# Patient Record
Sex: FEMALE | Race: WHITE | Employment: PART TIME | URBAN - METROPOLITAN AREA
[De-identification: names, ages, dates, MRNs, and addresses within clinical notes are randomized per-mention and may not be internally consistent; named-entity substitution may affect disease eponyms.]

---

## 2018-02-20 ENCOUNTER — OFFICE VISIT (OUTPATIENT)
Dept: OBGYN CLINIC | Age: 28
End: 2018-02-20

## 2018-02-20 ENCOUNTER — HOSPITAL ENCOUNTER (OUTPATIENT)
Dept: LAB | Age: 28
Discharge: HOME | End: 2018-02-20
Payer: COMMERCIAL

## 2018-02-20 ENCOUNTER — TELEPHONE (OUTPATIENT)
Dept: OBGYN CLINIC | Age: 28
End: 2018-02-20

## 2018-02-20 VITALS
SYSTOLIC BLOOD PRESSURE: 105 MMHG | DIASTOLIC BLOOD PRESSURE: 72 MMHG | BODY MASS INDEX: 37.03 KG/M2 | HEIGHT: 63 IN | WEIGHT: 209 LBS | HEART RATE: 54 BPM

## 2018-02-20 DIAGNOSIS — N94.4 PRIMARY DYSMENORRHEA: Primary | ICD-10-CM

## 2018-02-20 DIAGNOSIS — Z12.4 SCREENING FOR MALIGNANT NEOPLASM OF CERVIX: ICD-10-CM

## 2018-02-20 DIAGNOSIS — Z30.09 ENCOUNTER FOR OTHER GENERAL COUNSELING OR ADVICE ON CONTRACEPTION: ICD-10-CM

## 2018-02-20 DIAGNOSIS — Z01.419 WELL WOMAN EXAM WITH ROUTINE GYNECOLOGICAL EXAM: ICD-10-CM

## 2018-02-20 PROCEDURE — 88175 CYTOPATH C/V AUTO FLUID REDO: CPT | Performed by: OBSTETRICS & GYNECOLOGY

## 2018-02-20 PROCEDURE — 87624 HPV HI-RISK TYP POOLED RSLT: CPT | Performed by: OBSTETRICS & GYNECOLOGY

## 2018-02-20 RX ORDER — NORETHINDRONE ACETATE AND ETHINYL ESTRADIOL AND FERROUS FUMARATE 1MG-20(24)
1 KIT ORAL DAILY
Qty: 1 PACKAGE | Refills: 11 | Status: SHIPPED | OUTPATIENT
Start: 2018-02-20 | End: 2018-03-04

## 2018-02-20 NOTE — LETTER
2/27/2018 4:41 PM 
 
Ms. Delphine Chaidez [address] Ivette Carbone [address] [place] [id number] Dear Delphine Chaidez I have reviewed your results and have found the results listed below to be within normal ranges. Pap Smear: Normal 
 
My recommendations are as follows: Please schedule next Pap in 3 years, February 2021 Please call if you have any questions [phone] . Sincerely, 
 
 
Mary See MD

## 2018-02-20 NOTE — TELEPHONE ENCOUNTER
I have attempted to contact this patient by phone with the following results: left message to return my call on answering machine  Per Dr Kristian Wyatt , additional  information is needed concerning medication request .

## 2018-02-20 NOTE — PROGRESS NOTES
Subjective:   28 y.o. female for Well Woman Check. LMP 2/14 2018  Social History: single partner, contraception - condoms. Pertinent past medical hstory: hypertension, obesity    Patient Active Problem List    Diagnosis Date Noted    Vascular headache 01/04/2016    Post concussion syndrome 01/04/2016     Current Outpatient Prescriptions   Medication Sig Dispense Refill    norethindrone-e.estradiol-iron (MINASTRIN 24 FE) 1 mg-20 mcg(24) /75 mg (4) chew Take 1 Tab by mouth daily. Indications: DYSMENORRHEA 1 Package 11    FEXOFENADINE HCL (ALLEGRA PO) Take  by mouth.  propranolol LA (INDERAL LA) 160 mg capsule Take  by mouth daily.  naproxen (NAPROSYN) 500 mg tablet Take 1 Tab by mouth two (2) times daily (with meals). 60 Tab 1    topiramate (TOPAMAX) 25 mg tablet 3 at bedtime  x7 days then 4 at bedtime 120 Tab 1    tiZANidine (ZANAFLEX) 4 mg tablet 1/2  To 1  q am 11/2 to 2 qhs 90 Tab 1    LECITHIN by Does Not Apply route.  ergocalciferol (VITAMIN D2) 50,000 unit capsule Take 50,000 Units by mouth.  therapeutic multivitamin (THERAGRAN) tablet Take 1 tablet by mouth daily.  omega-3 fatty acids (FISH OIL) cap Take  by mouth.  calcium 500 mg tab Take  by mouth.  buPROPion XL (WELLBUTRIN XL) 300 mg XL tablet Take 300 mg by mouth every morning.        Allergies   Allergen Reactions    Pcn [Penicillins] Rash     Past Medical History:   Diagnosis Date    ADHD (attention deficit hyperactivity disorder) 3rd grade    Anxiety teenager    Hypertension     Vascular headache 1/4/2016     Past Surgical History:   Procedure Laterality Date    HX ANKLE FRACTURE TX  2000    broke rt ankle    HX ORTHOPAEDIC  age 7    broke left arm     Family History   Problem Relation Age of Onset    Heart Disease Mother     Hypertension Father     Breast Cancer Maternal Grandmother     Heart Disease Maternal Grandfather      Social History   Substance Use Topics    Smoking status: Never Smoker  Smokeless tobacco: Never Used    Alcohol use 1.5 oz/week     3 Glasses of wine per week      Comment: patient states none since Dec 2015        ROS:  Feeling well. No dyspnea or chest pain on exertion. No abdominal pain, change in bowel habits, black or bloody stools. No urinary tract symptoms. GYN ROS: normal menses, no abnormal bleeding, pelvic pain or discharge, no breast pain or new or enlarging lumps on self exam. No neurological complaints. Objective:     Visit Vitals    /72    Pulse (!) 54    Ht 5' 3\" (1.6 m)    Wt 209 lb (94.8 kg)    BMI 37.02 kg/m2     The patient appears well, alert, oriented x 3, in no distress. Neck supple. No adenopathy or thyromegaly. Lungs are clear, good air entry, no wheezes, rhonchi or rales. S1 and S2 normal, no murmurs, regular rate and rhythm. Abdomen soft without tenderness, guarding, mass or organomegaly. Extremities show no edema, normal peripheral pulses. Neurological is normal, no focal findings. BREAST EXAM: breasts appear normal, no suspicious masses, no skin or nipple changes or axillary nodes    PELVIC EXAM: VULVA: normal appearing vulva with no masses, tenderness or lesions, VAGINA: normal appearing vagina with normal color and discharge, no lesions, CERVIX: normal appearing cervix without discharge or lesions, UTERUS: uterus is normal size, shape, consistency and nontender, ADNEXA: normal adnexa in size, nontender and no masses    Assessment/Plan:   well woman  pap smear  counseled on family planning choices, health affects of obesity, exercise and healthy diet  return annually or prn    ICD-10-CM ICD-9-CM    1. Primary dysmenorrhea N94.4 625.3 norethindrone-e.estradiol-iron (MINASTRIN 24 FE) 1 mg-20 mcg(24) /75 mg (4) chew   2. Well woman exam with routine gynecological exam Z01.419 V72.31     [V72.31]   3. Screening for malignant neoplasm of cervix Z12.4 V76.2 PAP IG, APTIMA HPV AND RFX 16/18,45 ([id number])   4.  Encounter for other general counseling or advice on contraception Z30.09 V25.09 AMB POC URINE PREGNANCY TEST, VISUAL COLOR COMPARISON   .

## 2018-02-20 NOTE — MR AVS SNAPSHOT
[address] [facility] 
 
 
 [id number] 
[phone] Patient: Darvin Thakkar MRN: [id number] SJE:[id number] Visit Information Date & Time Provider Department Dept. Phone Encounter #  
 2/20/2018  1:30 PM Donna Kyle [facility] [phone] [id number] Upcoming Health Maintenance Date Due  
 PAP AKA CERVICAL CYTOLOGY 6/25/2010 Influenza Age 5 to Adult 8/1/2017 Allergies as of 2/20/2018  Review Complete On: 1/27/2016 By: Anca Shutlz LPN Severity Noted Reaction Type Reactions Pcn [Penicillins]  08/29/2014   Side Effect Rash Current Immunizations  Never Reviewed No immunizations on file. Not reviewed this visit Vitals BP Pulse Height(growth percentile) Weight(growth percentile) BMI OB Status 105/72 (!) 54 5' 3\" (1.6 m) 209 lb (94.8 kg) 37.02 kg/m2 Having regular periods Smoking Status Never Smoker Vitals History BMI and BSA Data Body Mass Index Body Surface Area 37.02 kg/m 2 2.05 m 2 Preferred Pharmacy Pharmacy Name Phone [facility] [address], [address] [facility] [phone] Your Updated Medication List  
  
   
This list is accurate as of: 2/20/18  2:12 PM.  Always use your most recent med list. ALLEGRA PO Take  by mouth.  
  
 calcium 500 mg Tab Take  by mouth. FISH OIL Cap Generic drug:  omega-3 fatty acids Take  by mouth. LECITHIN  
by Does Not Apply route. levonorgestrel-ethinyl estradiol 0.1-20 mg-mcg Tab Commonly known as:  Elan Radha Take  by mouth. naproxen 500 mg tablet Commonly known as:  NAPROSYN Take 1 Tab by mouth two (2) times daily (with meals). norethindrone-ethinyl estradiol 1 mg-20 mcg (21)/75 mg (7) Tab Commonly known as:  [place] FE 1/20 (28) Take 1 tablet by mouth daily. ORTHO MICRONOR 0.35 mg Tab Generic drug:  norethindrone Take  by mouth.  
  
 propranolol  mg capsule Commonly known as:  INDERAL LA Take  by mouth daily. therapeutic multivitamin tablet Commonly known as:  [facility] Take 1 tablet by mouth daily. tiZANidine 4 mg tablet Commonly known as:  Eloise Maldonado  
1/2  To 1  q am 11/2 to 2 qhs  
  
 topiramate 25 mg tablet Commonly known as:  TOPAMAX  
3 at bedtime  x7 days then 4 at bedtime VITAMIN D2 50,000 unit capsule Generic drug:  ergocalciferol Take 50,000 Units by mouth. WELLBUTRIN  mg XL tablet Generic drug:  buPROPion XL Take 300 mg by mouth every morning. Introducing [facility]! Nargis Arauz introduces [employer] patient portal. Now you can access parts of your medical record, email your doctor's office, and request medication refills online. 1. In your internet browser, go to [email or web address] 2. Click on the First Time User? Click Here link in the Sign In box. You will see the New Member Sign Up page. 3. Enter your [employer] Access Code exactly as it appears below. You will not need to use this code after youve completed the sign-up process. If you do not sign up before the expiration date, you must request a new code. · [employer] Access Code: [email or web address] Expires: 5/21/2018  2:12 PM 
 
4. Enter the last four digits of your Social Security Number (xxxx) and Date of Birth (mm/dd/yyyy) as indicated and click Submit. You will be taken to the next sign-up page. 5. Create a [employer] ID. This will be your [employer] login ID and cannot be changed, so think of one that is secure and easy to remember. 6. Create a [employer] password. You can change your password at any time. 7. Enter your Password Reset Question and Answer. This can be used at a later time if you forget your password. 8. Enter your e-mail address. You will receive e-mail notification when new information is available in [address] 9. Click Sign Up. You can now view and download portions of your medical record. 10. Click the Download Summary menu link to download a portable copy of your medical information. If you have questions, please visit the Frequently Asked Questions section of the [employer] website. Remember, [employer] is NOT to be used for urgent needs. For medical emergencies, dial 911. Now available from your iPhone and Android! Please provide this summary of care documentation to your next provider. If you have any questions after today's visit, please call [phone].

## 2018-03-02 ENCOUNTER — TELEPHONE (OUTPATIENT)
Dept: OBGYN CLINIC | Age: 28
End: 2018-03-02

## 2018-03-02 NOTE — TELEPHONE ENCOUNTER
Patient requesting birth control change bc medication only comes in mint flavor and mint is a migraine trigger.  Please advise

## 2019-01-08 ENCOUNTER — HOSPITAL ENCOUNTER (OUTPATIENT)
Dept: LAB | Age: 29
Discharge: HOME | End: 2019-01-08
Payer: COMMERCIAL

## 2019-01-08 LAB
ALBUMIN SERPL-MCNC: 3 G/DL (ref 3.4–5)
ALBUMIN/GLOB SERPL: 0.9 {RATIO} (ref 0.8–1.7)
ALP SERPL-CCNC: 75 U/L (ref 45–117)
ALT SERPL-CCNC: 31 U/L (ref 13–56)
ANION GAP SERPL CALC-SCNC: 6 MMOL/L (ref 3–18)
AST SERPL-CCNC: 21 U/L (ref 15–37)
BILIRUB DIRECT SERPL-MCNC: <0.1 MG/DL (ref 0–0.2)
BILIRUB SERPL-MCNC: 0.3 MG/DL (ref 0.2–1)
BUN SERPL-MCNC: 8 MG/DL (ref 7–18)
BUN/CREAT SERPL: 14 (ref 12–20)
CALCIUM SERPL-MCNC: 8.6 MG/DL (ref 8.5–10.1)
CHLORIDE SERPL-SCNC: 105 MMOL/L (ref 100–108)
CO2 SERPL-SCNC: 28 MMOL/L (ref 21–32)
CREAT SERPL-MCNC: 0.56 MG/DL (ref 0.6–1.3)
ERYTHROCYTE [DISTWIDTH] IN BLOOD BY AUTOMATED COUNT: 12 % (ref 11.6–14.5)
GLOBULIN SER CALC-MCNC: 3.4 G/DL (ref 2–4)
GLUCOSE SERPL-MCNC: 75 MG/DL (ref 74–99)
HCT VFR BLD AUTO: 40.6 % (ref 35–45)
HGB BLD-MCNC: 13.9 G/DL (ref 12–16)
MCH RBC QN AUTO: 32 PG (ref 24–34)
MCHC RBC AUTO-ENTMCNC: 34.2 G/DL (ref 31–37)
MCV RBC AUTO: 93.5 FL (ref 74–97)
PLATELET # BLD AUTO: 248 K/UL (ref 135–420)
PMV BLD AUTO: 10.1 FL (ref 9.2–11.8)
POTASSIUM SERPL-SCNC: 4.1 MMOL/L (ref 3.5–5.5)
PROT SERPL-MCNC: 6.4 G/DL (ref 6.4–8.2)
RBC # BLD AUTO: 4.34 M/UL (ref 4.2–5.3)
SODIUM SERPL-SCNC: 139 MMOL/L (ref 136–145)
WBC # BLD AUTO: 8 K/UL (ref 4.6–13.2)

## 2019-01-08 PROCEDURE — 80164 ASSAY DIPROPYLACETIC ACD TOT: CPT

## 2019-01-08 PROCEDURE — 80048 BASIC METABOLIC PNL TOTAL CA: CPT

## 2019-01-08 PROCEDURE — 80076 HEPATIC FUNCTION PANEL: CPT

## 2019-01-08 PROCEDURE — 85027 COMPLETE CBC AUTOMATED: CPT

## 2019-01-08 PROCEDURE — 36415 COLL VENOUS BLD VENIPUNCTURE: CPT

## 2019-01-09 LAB — VALPROATE SERPL-MCNC: 85 UG/ML (ref 50–100)
